# Patient Record
Sex: FEMALE | Race: WHITE
[De-identification: names, ages, dates, MRNs, and addresses within clinical notes are randomized per-mention and may not be internally consistent; named-entity substitution may affect disease eponyms.]

---

## 2018-02-06 ENCOUNTER — HOSPITAL ENCOUNTER (OUTPATIENT)
Dept: HOSPITAL 45 - X.SURG | Age: 78
Discharge: HOME | End: 2018-02-06
Attending: OPHTHALMOLOGY
Payer: COMMERCIAL

## 2018-02-06 VITALS — OXYGEN SATURATION: 98 % | SYSTOLIC BLOOD PRESSURE: 120 MMHG | HEART RATE: 82 BPM | DIASTOLIC BLOOD PRESSURE: 77 MMHG

## 2018-02-06 VITALS
WEIGHT: 125.27 LBS | HEIGHT: 65 IN | HEIGHT: 65 IN | BODY MASS INDEX: 20.87 KG/M2 | BODY MASS INDEX: 20.87 KG/M2 | WEIGHT: 125.27 LBS

## 2018-02-06 VITALS — TEMPERATURE: 97.34 F

## 2018-02-06 DIAGNOSIS — L57.0: ICD-10-CM

## 2018-02-06 DIAGNOSIS — J30.9: ICD-10-CM

## 2018-02-06 DIAGNOSIS — K21.9: ICD-10-CM

## 2018-02-06 DIAGNOSIS — K40.90: ICD-10-CM

## 2018-02-06 DIAGNOSIS — H26.9: Primary | ICD-10-CM

## 2018-02-06 DIAGNOSIS — M81.0: ICD-10-CM

## 2018-02-06 DIAGNOSIS — J45.909: ICD-10-CM

## 2018-02-06 DIAGNOSIS — M19.90: ICD-10-CM

## 2018-02-06 DIAGNOSIS — J45.30: ICD-10-CM

## 2018-02-06 RX ADMIN — GATIFLOXACIN SCH DROP: 5 SOLUTION/ DROPS OPHTHALMIC at 08:34

## 2018-02-06 RX ADMIN — TROPICAMIDE SCH DROP: 10 SOLUTION/ DROPS OPHTHALMIC at 08:16

## 2018-02-06 RX ADMIN — KETOROLAC TROMETHAMINE SCH DROP: 5 SOLUTION OPHTHALMIC at 08:25

## 2018-02-06 RX ADMIN — PHENYLEPHRINE HYDROCHLORIDE SCH DROP: 25 SOLUTION/ DROPS OPHTHALMIC at 08:20

## 2018-02-06 RX ADMIN — GATIFLOXACIN SCH DROP: 5 SOLUTION/ DROPS OPHTHALMIC at 08:19

## 2018-02-06 RX ADMIN — PHENYLEPHRINE HYDROCHLORIDE SCH DROP: 25 SOLUTION/ DROPS OPHTHALMIC at 08:15

## 2018-02-06 RX ADMIN — CYCLOPENTOLATE HYDROCHLORIDE SCH DROP: 10 SOLUTION/ DROPS OPHTHALMIC at 08:17

## 2018-02-06 RX ADMIN — TROPICAMIDE SCH DROP: 10 SOLUTION/ DROPS OPHTHALMIC at 08:21

## 2018-02-06 RX ADMIN — CYCLOPENTOLATE HYDROCHLORIDE SCH DROP: 10 SOLUTION/ DROPS OPHTHALMIC at 08:22

## 2018-02-06 RX ADMIN — KETOROLAC TROMETHAMINE SCH DROP: 5 SOLUTION OPHTHALMIC at 08:18

## 2018-02-06 NOTE — ANESTHESIA PROGRESS NT - [facility]
Anesthesia Post Op Note


Date & Time


Feb 6, 2018 at 09:27





Vital Signs


Pain Intensity:  0





Vital Signs Past 12 Hours








  Date Time  Temp Pulse Resp B/P (MAP) Pulse Ox O2 Delivery O2 Flow Rate FiO2


 


2/6/18 09:11 36.3 62 16 143/70 (94) 100 Room Air  


 


2/6/18 08:04 36.9 83 20 171/91 (117) 98 Room Air  











Notes


Mental Status:  alert / awake / arousable, participated in evaluation


Pt Amnestic to Procedure:  Yes


Nausea / Vomiting:  adequately controlled


Pain:  adequately controlled


Airway Patency, RR, SpO2:  stable & adequate


BP & HR:  stable & adequate


Hydration State:  stable & adequate


Anesthetic Complications:  no major complications apparent

## 2018-02-06 NOTE — DISCHARGE INSTRUCTIONS-[facility]
Discharge Instructions


Date of Service


Feb 6, 2018.





Visit


Reason for Visit:  Cataract Right Eye





Discharge


Discharge Diagnosis / Problem:  cataract





Discharge Goals


Goal(s):  Improve function





Activity Recommendations


Activity Limitations:  per Instructions/Follow-up section





Anesthesia


.





Post Anesthesia Instructions:





If you have had General Anesthesia or IV Sedation:





*  Do not drive today.


*  Resume driving when surgeon permits.


*  Do not make important decisions or sign legal documents today.


*  Call surgeon for:





   1.  Temperature elevations greater than 101 degrees F.


   2.  Uncontrollable pain.


   3.  Excessive bleeding.


   4.  Persistent nausea and vomiting.


   5.  Medication intolerance (nausea, vomiting or rash).





*  For nausea and vomiting use only clear liquids such as: tea, soda, bouillon 

until nausea subsides, then gradually increase diet as tolerated.





*  If you have any concerns or questions, call your surgeon's office.  If 

physician is unavailable and it is an emergency, call 911 or go to the nearest 

emergency room.





.





Diet Recommendations


Home Diet:  resume previous diet





Procedures


Procedures Performed:  


Right Cataract Phacoemulsification With Intraocular Lens Implant





Pending Studies


Studies pending at discharge:  no





Medical Emergencies








.


Who to Call and When:





Medical Emergencies:  If at any time you feel your situation is an emergency, 

please call 911 immediately.





.





Non-Emergent Contact


Non-Emergency issues call your:  Ophthalmologist





.


.








"Provider Documentation" section prepared by Osmin Melo.








.

## 2018-02-06 NOTE — [facility] OPERATIVE REPORT
Operative Report


Date of Service


Feb 6, 2018.





Operative Report


1.  PREOPERATIVE DIAGNOSIS:  Cataract of the right eye.





2.  POSTOPERATIVE DIAGNOSIS:  Same.





3.  PROCEDURE:  Phacoemulsification with intraocular lens implantation of the 

right eye.  





SURGEON:  Dr. Osmin Melo.  ANESTHESIA:  Topical Lidocaine gel, 1% Non-

Preserved intracameral Lidocaine, and monitored intravenous sedation.  





INDICATIONS FOR THE PROCEDURE:  The patient is a 78 - year-old female with a 

history of cataract of the right eye causing significant visual impairment.  

The details of the proposed procedure were explained to the patient who asked 

appropriate questions and following discussion of all risks, benefits and 

alternatives agreed to have the procedure done.  





4.  OPERATION AND FINDINGS:  





DESCRIPTION OF PROCEDURE:  After informed consent was obtained, the patient was 

brought to the Operating Room at the [facility].  The 

patient was placed in a supine position and then the right eye was prepped and 

draped in the usual sterile fashion for intraocular surgery.  A drop of topical 

Lidocaine gel was placed in the operative eye.  A wire lid speculum was then 

placed in the fornices.  A corneal paracentesis was then created temporally.  

The Non-Preserved Lidocaine was then instilled into the anterior chamber.  The 

anterior chamber was then pressurized with viscoelastic.  A 2.0 mm clear 

corneal incision was then created temporally.  A cystotome was inserted into 

the anterior chamber and used to create a tear in the anterior lens capsule.  

This capsular tear was then used to create a small flap and the flap was 

dragged in a counterclockwise direction in order to create a continuous 

curvilinear capsulorrhexis.  Hydrodissection was accomplished with balanced 

salt solution.  Phacoemulsification of the lens nucleus was then performed in a 

standard divide-and-conquer technique.  The phaco time was 26 seconds with an 

average power of 13 %.  The remaining cortical material was removed using 

irrigation aspiration.  The capsular bag was then filled with viscoelastic.  A 

B[employer]ch [employer] Lomb MI60L +20.0 diopters lens was then loaded into the injector and 

injected into the capsular bag.  The remaining viscoelastic was removed with 

the irrigation aspiration handpiece.  The wound was hydrated and then checked 

and found to be watertight.  The intraocular pressure was checked and found to 

be adequate.  The wire lid speculum was removed and the patient's face was 

cleaned and dried.  TobraDex ointment was placed in the inferior fornix.  The 

patient was discharged to the Recovery Room having tolerated the procedure 

well.  There were no complications.  The patient will be seen tomorrow in the 

office for follow-up.


I attest to the content of the Intraoperative Record and any orders documented 

therein.  Any exceptions are noted below.